# Patient Record
Sex: MALE | Race: WHITE
[De-identification: names, ages, dates, MRNs, and addresses within clinical notes are randomized per-mention and may not be internally consistent; named-entity substitution may affect disease eponyms.]

---

## 2017-04-01 ENCOUNTER — HOSPITAL ENCOUNTER (OUTPATIENT)
Dept: HOSPITAL 82 - ED | Age: 72
Setting detail: OBSERVATION
Discharge: HOME | End: 2017-04-01
Attending: INTERNAL MEDICINE | Admitting: INTERNAL MEDICINE
Payer: MEDICARE

## 2017-04-01 VITALS — DIASTOLIC BLOOD PRESSURE: 80 MMHG | SYSTOLIC BLOOD PRESSURE: 142 MMHG

## 2017-04-01 VITALS — BODY MASS INDEX: 24.98 KG/M2 | HEIGHT: 64 IN | WEIGHT: 146.31 LBS

## 2017-04-01 VITALS — SYSTOLIC BLOOD PRESSURE: 136 MMHG | DIASTOLIC BLOOD PRESSURE: 80 MMHG

## 2017-04-01 DIAGNOSIS — R07.2: Primary | ICD-10-CM

## 2017-04-01 DIAGNOSIS — E03.9: ICD-10-CM

## 2017-04-01 DIAGNOSIS — K21.9: ICD-10-CM

## 2017-04-01 DIAGNOSIS — M25.512: ICD-10-CM

## 2017-04-01 DIAGNOSIS — I10: ICD-10-CM

## 2017-04-01 DIAGNOSIS — N40.0: ICD-10-CM

## 2017-04-01 LAB
ALBUMIN SERPL-MCNC: 4 G/DL (ref 3.2–5)
ALP SERPL-CCNC: 117 U/L (ref 38–126)
ALT SERPL-CCNC: 57 U/L (ref 11–66)
AMYLASE SERPL-CCNC: 30 U/L (ref 30–110)
ANION GAP SERPL CALCULATED.3IONS-SCNC: 14 MMOL/L
AST SERPL-CCNC: 114 U/L (ref 19–48)
BASOPHILS NFR BLD AUTO: 1 % (ref 0–3)
BILIRUB UR QL STRIP.AUTO: NEGATIVE
BUN SERPL-MCNC: 21 MG/DL (ref 8–23)
BUN/CREAT SERPL: 23
CALCIUM SERPL-MCNC: 9 MG/DL (ref 8.4–10.2)
CHLORIDE SERPL-SCNC: 104 MMOL/L (ref 95–108)
CLARITY UR: CLEAR
CO2 SERPL-SCNC: 30 MMOL/L (ref 22–30)
COLOR UR AUTO: YELLOW
CREAT SERPL-MCNC: 0.9 MG/DL (ref 0.7–1.3)
EOSINOPHIL NFR BLD AUTO: 3 % (ref 0–8)
ERYTHROCYTE [DISTWIDTH] IN BLOOD BY AUTOMATED COUNT: 12.7 % (ref 11.5–15.5)
GLUCOSE SERPL-MCNC: 125 MG/DL (ref 82–115)
GLUCOSE UR STRIP.AUTO-MCNC: NEGATIVE MG/DL
HCT VFR BLD AUTO: 41.7 % (ref 39–50)
HGB BLD-MCNC: 14.2 G/DL (ref 14–18)
HGB UR QL STRIP.AUTO: (no result)
IMM GRANULOCYTES NFR BLD: 0.5 % (ref 0–1)
KETONES UR STRIP.AUTO-MCNC: NEGATIVE MG/DL
LEUKOCYTE ESTERASE UR QL STRIP.AUTO: NEGATIVE
LIPASE SERPL-CCNC: 111 U/L (ref 23–300)
LYMPHOCYTES NFR BLD: 14 % (ref 15–41)
MCH RBC QN AUTO: 30.7 PG  CALC (ref 26–32)
MCHC RBC AUTO-ENTMCNC: 34.1 G/L CALC (ref 32–36)
MCV RBC AUTO: 90.3 FL  CALC (ref 80–100)
MONOCYTES NFR BLD AUTO: 6 % (ref 2–13)
MYOGLOBIN SERPL-MCNC: 55 NG/ML (ref 0–121)
NEUTROPHILS # BLD AUTO: 4.84 THOU/UL (ref 1.82–7.42)
NEUTROPHILS NFR BLD AUTO: 76 % (ref 42–76)
NITRITE UR QL STRIP.AUTO: NEGATIVE
PH UR STRIP.AUTO: 7.5 [PH] (ref 4.5–8)
PLATELET # BLD AUTO: 220 THOU/UL (ref 130–400)
POTASSIUM SERPL-SCNC: 3.6 MMOL/L (ref 3.5–5.1)
PROT SERPL-MCNC: 6.6 G/DL (ref 6.3–8.2)
PROT UR QL STRIP.AUTO: NEGATIVE MG/DL
RBC # BLD AUTO: 4.62 MILL/UL (ref 4.7–6.1)
SODIUM SERPL-SCNC: 144 MMOL/L (ref 137–146)
SP GR UR STRIP.AUTO: 1.01
TSH SERPL DL<=0.05 MIU/L-ACNC: 3.16 UIU/ML (ref 0.47–4.68)
UROBILINOGEN UR QL STRIP.AUTO: 0.2 E.U./DL

## 2017-04-01 NOTE — NUR
PT ALERT AND ORIENTED RESTING BED, AM ASSESSMENT COMPLETED, SEE INTERVENTIONS,
SKIN WARM AND DRY WITH NO BREAKDOWN NOTED, DENIES PAIN AT THIS TIME, STATES IT
COMES AND GOES PEDRO PABLO MPROVES WITH  BELCHING, DENIES SOB OR DISTRESS, CALL BELL
WITHIN REACH, TELE READING SR RATE IN THE 60'S, BP STABLE, SAFETY MEASURES
REINFORCED, WILL CONTINUE TO MONITOR.

## 2017-04-01 NOTE — NUR
WENT IN WITH DR MEIER AS HE DISCUSSED TEST RESULTS AND PLAN OF ADMITTING PT
FOR OBSERVATION.  PT STATED HE DID NOT WANT TO STAY.  PT STATED SINCE HIS HEART
WAS FINE HE WANTED TO GO HOME.  DR MEIER EXPLAINED IN DETAIL THAT EVEN THOUGH
THE FIRST SET OF CARDIAC ENZYMES WERE NEGATIVE THE PT COULD STILL HAVE THE 2ND
SET BE POSITIVE. DR MEIER INFORMED PT THAT HE COULD NOT TELL THE PT HIS HEART
WAS FINE AND THAT IF THE PT DID NOT AGREE TO BE ADMITTED FOR FURTHER EVALUATION
THE PT WOULD HAVE TO SIGN OUT AMA.  PT WANTS TO CALL HIS WIFE AND WILL GIVE US
HIS DECISION.

## 2017-04-01 NOTE — NUR
Discharge instructions given. Patient verbalizes understanding of same.
Discharged in stable condition via Wheelchair to Home with
staff. All belongings sent with pt.

## 2017-04-01 NOTE — NUR
pt on call light with reports of "gas building up"; repositioning encouraged;
writer offered pt to sit in chair or ambulate within room; carbonated drink
offered to facilitate burping; sb on monitor; no distress noted; will continue
to monitor

## 2017-04-01 NOTE — NUR
Admission Note
 
 
Report Given to:         ALBERTA CUEVA.
Transported by:           Wheelchair          X Stretcher
 
Transported with:        X Nurse     Transporter   X Patent IV    O2
                         X Cardiac Monitor

## 2017-04-01 NOTE — NUR
male pt received to ICU 4 (MS david) in stable condition; no distress
noted; ambulatory to bathroom then bed with steady gait; weight obtained;
admission assessment completed at this time; pt c/c of midsternal chest pain
starting approx 2200 radiating to left shoulder; denies n/v/diaphoresis; pt
states "I think it gas"; a&o X3; denies pain at current; resp even and
unlabored; lungs clear; skin color wnl; ra; hr reg; sb on monitor; strong
pulses; no edema noted; bilat knee high baljit hose placed; abd soft with bs
present; no bm noted; pt admits to voiding without complication; urinal placed
at bedside; #20 in rac flushed and patent; no redness or edema noted at site;
plan of care/ labs explained; pt very concerned with price of labs, room rate,
charges; call light within reach; will continue to monitor

## 2017-04-01 NOTE — NUR
INTO SEE PATIENT, PLAN OF CARE DISCUSSED INCLDUING D/C IF THIRD
TROPONIN RESULTS BEING NEGATIVE, SKIN WARM AND DRY, OFFERS NO NEW COMPLAINTS,
EDUCATED REGARDING D/C PROCESS, VERBALIZES UNDERSTANDING, WILL CONTINUE TO
MONITOR.

## 2017-05-01 ENCOUNTER — APPOINTMENT (RX ONLY)
Dept: URBAN - METROPOLITAN AREA CLINIC 54 | Facility: CLINIC | Age: 72
Setting detail: DERMATOLOGY
End: 2017-05-01

## 2017-05-01 DIAGNOSIS — L57.8 OTHER SKIN CHANGES DUE TO CHRONIC EXPOSURE TO NONIONIZING RADIATION: ICD-10-CM

## 2017-05-01 DIAGNOSIS — D22 MELANOCYTIC NEVI: ICD-10-CM

## 2017-05-01 DIAGNOSIS — D18.0 HEMANGIOMA: ICD-10-CM

## 2017-05-01 DIAGNOSIS — L82.1 OTHER SEBORRHEIC KERATOSIS: ICD-10-CM

## 2017-05-01 PROBLEM — D22.9 MELANOCYTIC NEVI, UNSPECIFIED: Status: ACTIVE | Noted: 2017-05-01

## 2017-05-01 PROBLEM — D18.01 HEMANGIOMA OF SKIN AND SUBCUTANEOUS TISSUE: Status: ACTIVE | Noted: 2017-05-01

## 2017-05-01 PROCEDURE — 99214 OFFICE O/P EST MOD 30 MIN: CPT

## 2017-05-01 PROCEDURE — ? COUNSELING

## 2017-05-01 NOTE — PROCEDURE: MIPS QUALITY
Quality 226: Preventive Care And Screening: Tobacco Use: Screening And Cessation Intervention: Patient screened for tobacco and never smoked
Quality 110: Preventive Care And Screening: Influenza Immunization: Influenza Immunization not Administered because Patient Refused.
Quality 111:Pneumonia Vaccination Status For Older Adults: Pneumococcal Vaccination not Administered or Previously Received, Reason not Otherwise Specified
Quality 47: Advance Care Plan: Advance Care Planning discussed and documented in the medical record; patient did not wish or was not able to name a surrogate decision maker or provide an advance care plan.
Quality 131: Pain Assessment And Follow-Up: Pain assessment using a standardized tool is documented as negative, no follow-up plan required
Quality 130: Documentation Of Current Medications In The Medical Record: Current Medications Documented
Detail Level: Detailed

## 2018-05-07 ENCOUNTER — APPOINTMENT (RX ONLY)
Dept: URBAN - METROPOLITAN AREA CLINIC 54 | Facility: CLINIC | Age: 73
Setting detail: DERMATOLOGY
End: 2018-05-07

## 2018-05-07 DIAGNOSIS — D22 MELANOCYTIC NEVI: ICD-10-CM

## 2018-05-07 DIAGNOSIS — L82.1 OTHER SEBORRHEIC KERATOSIS: ICD-10-CM

## 2018-05-07 DIAGNOSIS — L57.8 OTHER SKIN CHANGES DUE TO CHRONIC EXPOSURE TO NONIONIZING RADIATION: ICD-10-CM

## 2018-05-07 DIAGNOSIS — D18.0 HEMANGIOMA: ICD-10-CM

## 2018-05-07 PROBLEM — D22.9 MELANOCYTIC NEVI, UNSPECIFIED: Status: ACTIVE | Noted: 2018-05-07

## 2018-05-07 PROBLEM — D18.01 HEMANGIOMA OF SKIN AND SUBCUTANEOUS TISSUE: Status: ACTIVE | Noted: 2018-05-07

## 2018-05-07 PROCEDURE — 99214 OFFICE O/P EST MOD 30 MIN: CPT

## 2018-05-07 PROCEDURE — ? COUNSELING

## 2019-05-28 ENCOUNTER — APPOINTMENT (RX ONLY)
Dept: URBAN - METROPOLITAN AREA CLINIC 52 | Facility: CLINIC | Age: 74
Setting detail: DERMATOLOGY
End: 2019-05-28

## 2019-05-28 DIAGNOSIS — L82.1 OTHER SEBORRHEIC KERATOSIS: ICD-10-CM

## 2019-05-28 DIAGNOSIS — L57.8 OTHER SKIN CHANGES DUE TO CHRONIC EXPOSURE TO NONIONIZING RADIATION: ICD-10-CM

## 2019-05-28 DIAGNOSIS — D18.0 HEMANGIOMA: ICD-10-CM

## 2019-05-28 DIAGNOSIS — B35.1 TINEA UNGUIUM: ICD-10-CM

## 2019-05-28 DIAGNOSIS — D22 MELANOCYTIC NEVI: ICD-10-CM

## 2019-05-28 PROBLEM — D18.01 HEMANGIOMA OF SKIN AND SUBCUTANEOUS TISSUE: Status: ACTIVE | Noted: 2019-05-28

## 2019-05-28 PROBLEM — D22.9 MELANOCYTIC NEVI, UNSPECIFIED: Status: ACTIVE | Noted: 2019-05-28

## 2019-05-28 PROCEDURE — ? PRESCRIPTION

## 2019-05-28 PROCEDURE — 99214 OFFICE O/P EST MOD 30 MIN: CPT

## 2019-05-28 PROCEDURE — ? COUNSELING

## 2019-05-28 RX ORDER — CICLOPIROX OLAMINE 7.7 MG/ML
0.77% SUSPENSION TOPICAL BID
Qty: 1 | Refills: 4 | COMMUNITY
Start: 2019-05-28

## 2019-05-28 RX ADMIN — CICLOPIROX OLAMINE 0.77%: 7.7 SUSPENSION TOPICAL at 18:39

## 2019-05-28 ASSESSMENT — LOCATION DETAILED DESCRIPTION DERM
LOCATION DETAILED: RIGHT GREAT TOENAIL
LOCATION DETAILED: LEFT GREAT TOENAIL

## 2019-05-28 ASSESSMENT — LOCATION SIMPLE DESCRIPTION DERM
LOCATION SIMPLE: LEFT GREAT TOE
LOCATION SIMPLE: RIGHT GREAT TOE

## 2019-05-28 ASSESSMENT — LOCATION ZONE DERM: LOCATION ZONE: TOENAIL

## 2019-05-28 NOTE — PROCEDURE: MIPS QUALITY
Additional Notes: The patient states pain as negative, and on a scale of 0-10, their pain level is 0.
Quality 474: Zoster Vaccination Status: Shingrix Vaccination not Administered or Previously Received, Reason not Otherwise Specified
Detail Level: Simple
Quality 130: Documentation Of Current Medications In The Medical Record: Current Medications Documented
Quality 131: Pain Assessment And Follow-Up: Pain assessment using a standardized tool is documented as negative, no follow-up plan required